# Patient Record
(demographics unavailable — no encounter records)

---

## 2024-12-12 NOTE — COUNSELING
[Encouraged to increase physical activity] : Encouraged to increase physical activity [Needs reinforcement, provided] : Patient needs reinforcement on understanding of disease, goals and obesity follow-up plan; reinforcement was provided

## 2024-12-12 NOTE — HISTORY OF PRESENT ILLNESS
[FreeTextEntry1] : 79 year old male who presents today for a complete physical exam. states wife has told him to start anxiety med, but he feels she is the one that needs it denies anxiety or depression states follows w/ uro, has low PSA and nml ua per pt

## 2024-12-12 NOTE — HEALTH RISK ASSESSMENT
[Good] : ~his/her~ current health as good [Fair] :  ~his/her~ mood as fair [No] : In the past 12 months have you used drugs other than those required for medical reasons? No [No falls in past year] : Patient reported no falls in the past year [0] : 2) Feeling down, depressed, or hopeless: Not at all (0) [Never] : Never [# of Members in Household ___] :  household currently consist of [unfilled] member(s) [] :  [# Of Children ___] : has [unfilled] children [Feels Safe at Home] : Feels safe at home [Fully functional (bathing, dressing, toileting, transferring, walking, feeding)] : Fully functional (bathing, dressing, toileting, transferring, walking, feeding) [Fully functional (using the telephone, shopping, preparing meals, housekeeping, doing laundry, using] : Fully functional and needs no help or supervision to perform IADLs (using the telephone, shopping, preparing meals, housekeeping, doing laundry, using transportation, managing medications and managing finances) [de-identified] : not active [de-identified] : safia odonnell, protein daily [Change in mental status noted] : No change in mental status noted [Language] : denies difficulty with language [Handling Complex Tasks] : denies difficulty handling complex tasks [Reports changes in hearing] : Reports no changes in hearing [Reports changes in vision] : Reports no changes in vision [Reports changes in dental health] : Reports no changes in dental health [de-identified] : wife and sister [FreeTextEntry3] : 1 son and 1 dgtr

## 2025-03-05 NOTE — HISTORY OF PRESENT ILLNESS
[FreeTextEntry1] : 81 yo male here for follow up and repeat bw states feels tired still taking same dose of statin despite prior recommendation to take half tab or qod

## 2025-03-05 NOTE — PHYSICAL EXAM
[No Acute Distress] : no acute distress [Well-Appearing] : well-appearing [Normal Sclera/Conjunctiva] : normal sclera/conjunctiva [EOMI] : extraocular movements intact [Normal Outer Ear/Nose] : the outer ears and nose were normal in appearance [No JVD] : no jugular venous distention [Normal] : normal rate, regular rhythm, normal S1 and S2 and no murmur heard [Coordination Grossly Intact] : coordination grossly intact [Normal Affect] : the affect was normal [Alert and Oriented x3] : oriented to person, place, and time [Normal Insight/Judgement] : insight and judgment were intact

## 2025-07-18 NOTE — HISTORY OF PRESENT ILLNESS
[FreeTextEntry1] : npa: spot of concern [de-identified] : 80M presenting today as a new patient for evaluation of the following the above. Lesion of concern on the left flank has been present for many years. Presenting today with his wife who feels that it started out as a skin tag and has been there for at least a decade. Sometimes itchy.   No personal hx of skin cancer. Family Hx: No family history of skin cancer

## 2025-07-18 NOTE — ASSESSMENT
[FreeTextEntry1] : #Seborrheic keratosis, irritated - I have discussed the nature and usual course with the Patient. Reassured. - Because the lesion is irritated and cause pain, the Patient has requested removal. - Cryotherapy to 1 lesion administered without complication. - Aftercare discussed.